# Patient Record
Sex: FEMALE | Race: WHITE
[De-identification: names, ages, dates, MRNs, and addresses within clinical notes are randomized per-mention and may not be internally consistent; named-entity substitution may affect disease eponyms.]

---

## 2018-03-27 ENCOUNTER — HOSPITAL ENCOUNTER (INPATIENT)
Dept: HOSPITAL 59 - MEDSURG | Age: 78
LOS: 1 days | Discharge: HOME HEALTH | DRG: 470 | End: 2018-03-28
Attending: ORTHOPAEDIC SURGERY | Admitting: ORTHOPAEDIC SURGERY
Payer: MEDICARE

## 2018-03-27 DIAGNOSIS — I48.91: ICD-10-CM

## 2018-03-27 DIAGNOSIS — E78.00: ICD-10-CM

## 2018-03-27 DIAGNOSIS — Z79.01: ICD-10-CM

## 2018-03-27 DIAGNOSIS — M17.12: Primary | ICD-10-CM

## 2018-03-27 LAB
ABO GROUP: (no result)
ANTIBODY SCREEN: NEGATIVE
RH TYPE: POSITIVE

## 2018-03-27 PROCEDURE — 80048 BASIC METABOLIC PNL TOTAL CA: CPT

## 2018-03-27 PROCEDURE — 85014 HEMATOCRIT: CPT

## 2018-03-27 PROCEDURE — 0SRD069 REPLACEMENT OF LEFT KNEE JOINT WITH OXIDIZED ZIRCONIUM ON POLYETHYLENE SYNTHETIC SUBSTITUTE, CEMENTED, OPEN APPROACH: ICD-10-PCS | Performed by: ORTHOPAEDIC SURGERY

## 2018-03-27 PROCEDURE — 97530 THERAPEUTIC ACTIVITIES: CPT

## 2018-03-27 PROCEDURE — 86900 BLOOD TYPING SEROLOGIC ABO: CPT

## 2018-03-27 PROCEDURE — 86901 BLOOD TYPING SEROLOGIC RH(D): CPT

## 2018-03-27 PROCEDURE — 97116 GAIT TRAINING THERAPY: CPT

## 2018-03-27 PROCEDURE — 86850 RBC ANTIBODY SCREEN: CPT

## 2018-03-27 PROCEDURE — 85018 HEMOGLOBIN: CPT

## 2018-03-27 RX ADMIN — CLINDAMYCIN PHOSPHATE SCH MLS/HR: 18 INJECTION, SOLUTION INTRAVENOUS at 17:33

## 2018-03-27 RX ADMIN — DEXTROSE AND SODIUM CHLORIDE SCH: 5; .9 INJECTION, SOLUTION INTRAVENOUS at 15:37

## 2018-03-27 RX ADMIN — DEXTROSE AND SODIUM CHLORIDE SCH MLS/HR: 5; .9 INJECTION, SOLUTION INTRAVENOUS at 19:48

## 2018-03-27 RX ADMIN — HYDROCODONE BITARTRATE AND ACETAMINOPHEN PRN EACH: 325; 10 TABLET ORAL at 21:42

## 2018-03-27 RX ADMIN — DOCUSATE SODIUM SCH MG: 100 TABLET, FILM COATED ORAL at 21:42

## 2018-03-27 RX ADMIN — Medication SCH MG: at 21:42

## 2018-03-27 NOTE — REHAB EVALUATION
Patient Information





- Patient Information


Diagnosis: DJD L Knee


Ordered Treatment: PT Evaluate and Treat


Status: Initial Evaluation


Surgery: Yes (L TKA)


Date of Surgery: 03/27/18


Past Medical/Surgical Hx: 


 PAST MEDICAL/SURGICAL HISTORY





Past Surgical History            RTKA 6-17


                                 tonsils


                                 lumpectomy left breast malignant


                                 hyst


                                 cystocele/rectocele


                                 cats bilat


                                 c scope





PMH - Respiratory





Hx Respiratory Disorders         Yes


Hx Pneumonia                     Yes


Hx Sleep Apnea                   Yes


Hx of CPAP                       Yes





PMH - Cardiovascular





Hx Cardiovascular Disorders      Yes


Hx Abnormal EKG                  Yes


Hx Edema                         Yes: lymphedema LE


Hx Irregular Heartbeat           Yes: one episode of a fib resolved pt is on 

meds


Hx Palpitations                  Yes


Hx of Mitral Valve Prolapse      Yes: minimal


Exercise Tolerance               Good





PMH - Neuro





Hx Neurological Disorders        Yes


Hx Syncope                       Yes: had episode 1 wk ago of terrible abdominal


                                 pain and then passed  see below


Comment:                         out for 5 minutes very low b/p spent night in


                                 hosp neg w/u sees cardio 3-21





PMH - GI





Hx Gastrointestinal Disorders    Yes


Hx Gastroesophageal Reflux       Yes





PMH - 





Hx Genitourinary Disorders       Yes


Hx Urinary Tract Infection       Yes: susceptible to them





PMH - Endocrine





Hx Endocrine Disorders           Yes


Hx Thyroid Disease               Yes: hypo on meds


Comment:                         parathyroid goiter no problems swallowing or


                                 laying down small





PMH - Musculoskeletal





Hx Musculoskeletal Disorders     Yes


Hx Arthritis                     Yes


Hx Osteoporosis                  osteopenia





PMH - Psych





Hx Psychiatric Problems          Yes


Hx Anxiety                       Yes


Hx Depression                    Yes





PMH - Hematology/Oncology





Hx Hematology/Oncology           Yes


Disorders                        


Hx Cancer                        Yes: breast left


Hx Radiation Therapy             Yes


Comment:                         on eliquis DO NOT USE LEFT ARM








Social History: Detail (The patient lives in a one story home with her spouse. 

There are 2 steps to enter with a railing on the left when entering the home. 

The patient's bathroom has a tub/shower combination, but she does not have/use 

a shower seat. Her toilet is elevated. She has grab bars in the shower, but 

does not around the toilet. She will be using a 4WW for ambulation)


Precautions: Other (WBAT on L)





- Time With Patient


Total Time Spent With Patient (Min): 30


Treatment Procedures: Detail (PT Initial Evaluation)





Subjective Information





- Subjective Information


Per Patient (The patient states that she is not having any pain at this time. 

She says that she has slight dizziness, but felt that she was okay to complete 

therapy activities.)





Objective Data





- Pain


Pain Present: No


Pain Intensity: 0


Pain Scale Used: Numeric (1 - 10)





- Mental Status


Patient Orientation: Oriented x3





- ROM


Within normal limits (L Knee limited as expected s/p L TKA. Other joints were 

not formally assessed, but were within functional limits for activity.)





- Strength/Tone


Within normal limits (L Knee limited as expected s/p L TKA. Other joints were 

not formally assessed, but were within functional limits for activity.)





- Bed Mobility


Independent (The patient was able to transfer from supine to sit independently, 

and was able to scoot to the middle of the bed independently)





- Transfers


Independent (The patient was independent with sit to stand and stand to sit. 

The patient was also independent with toilet transfer each direction.)





- Balance


Balance Sitting: Good (No LOB while sitting at the bedside prior to ambulation. 

Had complaints of dizziness, but seemed to alleviate symptoms with moving 

around.)


Balance Standing: Good (No LOB with immediate standing at the bedside after sit 

to stand. The patient had no LOB with gait activities completed.)





- Sensation


Intact





- Gait


Detail (The patient used a 4WW and WBAT on L during ambulation. She was able to 

ambulate from her room, to the [facility], down to the end of the [facility] floor, 

and back to her room (total of 200 feet). She had no LOB with gait activities.)





Therapy Assessment





- Therapy Assessment


Detail (The patient was independent with bed mobility and transfers, and had 

functional strength for activities. The patient required supervision for gait 

only, and had good standing balance throughout. She did have ROM and strength 

limitations as expected s/p L TKA. The patient's gait skills on stairs were not 

assessed at initial evaluation. The patient should progress well with inpatient 

PT.)





Patient Education





- Patient Education


Teaching Topic: Exercise/Activity (HEP was reviewed, which included: Quad sets, 

glute. sets, hamstring sets/Heel slides, SLR, and ankle pumps. The patient was 

instructed to complete 10 reps of each exercise and complete 1-2x/day)


Response: Return Demonstration, Verbalize Understanding


Teaching Method: Discussion, Demonstration


Teaching Recipient: Patient


Barriers To Learning: None





Problem List





- Problem List


Physical Therapy Problem List: Detail (1) Ability to ascend/descend stairs not 

assessed 2) Decreased ROM and strength in L knee)





Goals





- Goals


Physical Therapy Goals: 1) The patient will be able to ascend/descend 2 steps 

with supervision for safe entry into the home environment.  2) The patient will 

be independent with HEP to increase ROM and strength in L knee for increased 

functionality in the home.





Prognosis





- Prognosis


Good





Plan





- Plan


Physical Therapy Plan: The patient will be seen 1-2x/day M-F for gait training (

including stairs) and LE strengthening activities.

## 2018-03-28 LAB
ANION GAP SERPL CALC-SCNC: 11 MMOL/L (ref 7–16)
BUN SERPL-MCNC: 11 MG/DL (ref 8–23)
CO2 SERPL-SCNC: 24 MMOL/L (ref 22–29)
CREAT SERPL-MCNC: 0.7 MG/DL (ref 0.5–0.9)
EST GLOMERULAR FILTRATION RATE: > 60 ML/MIN
GLUCOSE SERPL-MCNC: 143 MG/DL (ref 74–109)
HCT VFR BLD CALC: 38.5 % (ref 35–47)
HGB BLD-MCNC: 12.2 GM/DL (ref 11.6–16)

## 2018-03-28 RX ADMIN — DOCUSATE SODIUM SCH MG: 100 TABLET, FILM COATED ORAL at 11:16

## 2018-03-28 RX ADMIN — HYDROCODONE BITARTRATE AND ACETAMINOPHEN PRN EACH: 325; 10 TABLET ORAL at 11:19

## 2018-03-28 RX ADMIN — Medication SCH MG: at 11:18

## 2018-03-28 RX ADMIN — HYDROCODONE BITARTRATE AND ACETAMINOPHEN PRN EACH: 325; 10 TABLET ORAL at 05:36

## 2018-03-28 RX ADMIN — CLINDAMYCIN PHOSPHATE SCH MLS/HR: 18 INJECTION, SOLUTION INTRAVENOUS at 00:27

## 2018-03-28 RX ADMIN — DEXTROSE AND SODIUM CHLORIDE SCH MLS/HR: 5; .9 INJECTION, SOLUTION INTRAVENOUS at 04:12

## 2018-03-28 NOTE — OPERATIVE NOTE
DATE:  03/27/2018.



PREOPERATIVE DIAGNOSIS:  ENDSTAGE ARTHROSIS OF THE LEFT KNEE.



POSTOPERATIVE DIAGNOSIS:  ENDSTAGE ARTHROSIS OF THE LEFT KNEE.



PROCEDURE:  Cemented left total knee arthroplasty using [employer] 
II components, with a size 4 Cobalt chrome femur, a size 3 stemmed tibial 
baseplate, and a 13-mm lipped tibial insert.  The patella was not resurfaced 
because it was too thin.



STAFF SURGEON:  Carlos Marroquin M.D.



ANESTHESIA:  SPINAL.



PREPARATION:  ChloraPrep.



INDIVIDUAL CONSIDERATIONS:  This lady is morbidly obese with a body mass index 
of 40.  Because of this, dissection was much more difficult and exposure was 
more difficult.  She had two to three inches of fat around her knee. 



PROCEDURE:  The patient was taken to the operating room and placed supine on 
the operating table.  She had successful induction of a spinal anesthetic.  Her 
left lower extremity was prepped and draped in the usual fashion.



The limb was elevated.  The tourniquet was inflated to 250 mm Hg.  Sharp 
dissection was carried down through the skin and subcutaneous tissues. Small 
veins were coagulated with a Bovie.  A medial arthrotomy was performed.  The 
patella was everted and the knee was flexed.  She had exposed bone in the notch 
and bone loss in the medial compartment.  The fat pad was resected, the 
anterior cruciate ligament was sacrificed, and provisional anterior 
meniscectomies were performed.  The capsule was released from the medial 
proximal tibia.  The initial femoral  hole was then made freehand.  The 
intramedullary femoral cutting jig was placed.  It was cut in 7.0 degrees of 
valgus, adjusted for rotation, and secured with pins for a 10 mm resection.  
The initial transverse cut was then made.  Skin guide was placed through the 
anterior and posterior  holes.  It was found that a size 4 would be 
appropriate.  I needed to translate the cutting block anteriorly 2.0 mm so as 
not to notch.  After making the cuts and trimming osteophytes, a size 4 trial 
was placed and was found to fit well.  



The tibia was brought forward and the remainder of the meniscal remnants were 
removed with a Bovie.  The extra-articular tibial cutting jig was placed, and 
it was cut in neutral with a 3-degree AP slope.  Care was taken to adjust for 
rotation and flexion using the extra-articular alignment guide and bony 
landmarks.  It was set for a 9 mm resection, keyed off the high lateral side, 
and secured with pins.  When cutting the tibia, care was taken to preserve the 
posterior cruciate ligament insertion on the tibia.  Medial osteophytes were 
removed, and I was able to fit a size 3 baseplate.  It was adjusted for 
rotation and secured with pins.  With an 13 mm trial and the femoral trial, 
there was excellent motion and stability.  Ligamentous balance, rotation, and 
alignment were thought to be normal.  The femoral  holes were impacted and 
the triflange tibial stamp was impacted, and these trial components were 
removed.



The patient unfortunately had a 19 mm thick patella.  This was way too thin to 
resurface, especially since she is morbidly obese.  I just had to trim 
osteophytes.  The tourniquet was let down briefly to get the bleeders 
posteriorly and was then placed back up again.  The knee was then thoroughly 
irrigated out with pulsatile Betadine and saline to remove any visual or 
palpable debris.  Bony surfaces were then dried.  A size 3 stemmed tibial 
baseplate was cemented into place, followed by impaction of the 13 mm lipped 
tibial insert, followed by cementing in the size 4 Cobalt chrome femur.   
Implant surfaces were compressed and excess cement was removed.  After the 
cement had set, there was excellent motion and stability.  Ligamentous balance, 
rotation, alignment, and patellofemoral tracking were normal, and no lateral 
release was required.  Again, thorough irrigation.  The periosteum and 
subcutaneous and skin were infiltrated with 30 mL of 0.5% Marcaine with 
epinephrine.  The capsule was then closed with a running #2 Quill, the 
subcutaneous was closed in multiple layers of #0 Quill, and the skin was closed 
with staples.  A total of 30 mL of saline was mixed with 1.0 gm of tranexamic 
acid which was injected into the knee through a sterile 18-gauge needle.  She 
had received 1.0 gm of tranexamic acid intravenous prior.  The patient 
tolerated the procedure well.  Needle and sponge counts were correct.  
Estimated blood loss was minimal.  She was taken back to Recovery in good 
condition.  There were no complications.



JOB NUMBER: [id number]
MTDD

## 2018-03-28 NOTE — REHAB EVALUATION
Patient Information





- Patient Information


Diagnosis: DJD L Knee


Ordered Treatment: OT Evaluate and Treat


Status: Initial Evaluation


Surgery: Yes (L TKA)


Date of Surgery: 03/27/18


Past Medical/Surgical Hx: 


 PAST MEDICAL/SURGICAL HISTORY





Past Surgical History            RTKA 6-17


                                 tonsils


                                 lumpectomy left breast malignant


                                 hyst


                                 cystocele/rectocele


                                 cats bilat


                                 c scope





PMH - Respiratory





Hx Respiratory Disorders         Yes


Hx Pneumonia                     Yes


Hx Sleep Apnea                   Yes


Hx of CPAP                       Yes





PMH - Cardiovascular





Hx Cardiovascular Disorders      Yes


Hx Abnormal EKG                  Yes


Hx Edema                         Yes: lymphedema LE


Hx Irregular Heartbeat           Yes: one episode of a fib resolved pt is on 

meds


Hx Palpitations                  Yes


Hx of Mitral Valve Prolapse      Yes: minimal


Exercise Tolerance               Good





PMH - Neuro





Hx Neurological Disorders        Yes


Hx Syncope                       Yes: had episode 1 wk ago of terrible abdominal


                                 pain and then passed  see below


Comment:                         out for 5 minutes very low b/p spent night in


                                 hosp neg w/u sees cardio 3-21





PMH - GI





Hx Gastrointestinal Disorders    Yes


Hx Gastroesophageal Reflux       Yes





PMH - 





Hx Genitourinary Disorders       Yes


Hx Urinary Tract Infection       Yes: susceptible to them





PMH - Endocrine





Hx Endocrine Disorders           Yes


Hx Thyroid Disease               Yes: hypo on meds


Comment:                         parathyroid goiter no problems swallowing or


                                 laying down small





PMH - Musculoskeletal





Hx Musculoskeletal Disorders     Yes


Hx Arthritis                     Yes


Hx Osteoporosis                  osteopenia





PMH - Psych





Hx Psychiatric Problems          Yes


Hx Anxiety                       Yes


Hx Depression                    Yes





PMH - Hematology/Oncology





Hx Hematology/Oncology           Yes


Disorders                        


Hx Cancer                        Yes: breast left


Hx Radiation Therapy             Yes


Comment:                         on eliquis DO NOT USE LEFT ARM








Premorbid Status: Detail (Pt lives with spouse in a 1 story house with basement

, laundry is in the basement.  She has 2 steps and 1 handrailing at the garage 

entrance.  She has a tub/shower combination with grab bar, no seat and an 

elevated toilet with riser, no grab bar.  She is Ind with meal prep, laundry, 

home mgmt and self cares although spouse will be assisting as needed.  She has 

a 4 wheeled walker and straight cane.)


Precautions: Universal, Fall, Other (WBAT on L)





- Time With Patient


Total Time Spent With Patient (Min): 35


Treatment Procedures: Detail (OT eval low complexity)





Subjective Information





- Subjective Information


Per Patient





Objective Data





- Pain


Pain Present: No (Pt reports no pain currently)





- Mental Status


Patient Orientation: Oriented x3





- Visual Perception


Appears within normal limits for therapeutic activities





- ROM


Within normal limits (Dain UE AROM WNL)





- Strength/Tone


Within normal limits (Dain UE strength WNL)





- Coordination


Appears within normal limits for therapeutic activities





- Bed Mobility


Independent (Ind with supine to sit.)





- Transfers


Independent (Ind with sit to stand from EOB.)





- Balance


Balance Sitting: Good


Balance Standing: Fair





- Sensation


Intact





- Gait


Detail (Pt ambulating in room with 4 wheeled walker Indly.)





- ADL's/IADL's


Detail (Pt educated and demonstrates learning of modified LE dressing 

techniques.  She required min assist to pull heel of left foot into shoe due to 

swelling and wrap.  Reviewed shower and kitchen safety/modifications and pt 

verbalized understanding.)





Therapy Assessment





- Therapy Assessment


Detail (Pt is safe and Ind with self care activities.)





Problem List





- Problem List


Physical Therapy Problem List: Detail ( 2) Decreased ROM and strength in L knee)


Occupational Therapy Problem List: Detail (No current OT problems identified.)





Goals





- Goals


Physical Therapy Goals: Pt. has met all inpatient PT goals.


Occupational Therapy Goals: No current OT goals identified.





Prognosis





- Prognosis


Good





Plan





- Plan


Physical Therapy Plan: D/C pt. from inpatient PT.


Occupational Therapy Plan: No further IP OT recommended.  Thank you for this 

referral.

## 2018-03-28 NOTE — DISCHARGE SUMMARY
DATE OF ADMISSION:  03/27/2018



DATE OF DISCHARGE:  03/28/2018



DATE OF SURGERY:  03/27/2018



HISTORY: Anjelica is a delightful 77-year-old female who presents with end-stage 
arthrosis of her left knee. She was admitted after a left total knee 
arthroplasty. Postoperatively, she did great. Her hospital course was 
unremarkable. Her discharge hemoglobin was 12.2.  



DISCHARGE INSTRUCTIONS:  The plan is to discharge her to home in the care of 
her family. Home PT and Visiting Nurse have been arranged. She will be given 
Norco for pain. She chronically takes Eliquis, so she will resume that and that 
will cover her for DVT prophylaxis. The Visiting Nurse will remove her sutures 
in two weeks and she will follow-up in my office in four weeks. 



PRIMARY DIAGNOSIS/PRIMARY DIAGNOSIS:  END-STAGE ARTHROSIS OF THE LEFT KNEE. 



OPERATIONS AND PROCEDURES:  CEMENTED LEFT TOTAL KNEE ARTHROPLASTY. 



JOB NUMBER:  [id number]
MTDD

## 2018-03-28 NOTE — PHYSICAL THERAPY TX NOTE
Physical Therapy Tx Note





- Treatment Note


Tolerated: Good (Pt. required 1 rest period with ambulation for 20 seconds. Pt. 

denied nausea/SOB with ambulation training, just fatigue. Pt. independently 

ambulated 52 feet with four wheeled walker, and ascended and descended three 

steps with single point cane appropriately.)


Total Time Spent With Patient: 45


Physical Therapy Tx Note: Detail (Pt. completed gait training and HEP review. 

She appropriately ascended and descended 3 steps. Pt. verbalized good 

understanding of precautions and was indepdendent with bed mobility and 

transfer. Pt. did exhibit quad weakness that limits independent SLR with bed 

mobility, but did express that she has a strap for home and will help to lift 

her involved LE via strap. Pt. was left supine and nursing was notified of pt.'

s status, nursing stated the pt. is to have another dressing applied following 

PT so the pt. was not hooked up to IPC or cryo. Pt. was left supine with call 

light availble.)


Physical Therapy Problem List: Detail ( 2) Decreased ROM and strength in L knee)


Physical Therapy Goals: Pt. has met all inpatient PT goals.


Prognosis: Good (Pt. is appropriate for D/C to home environment with assistance 

from  and use of single point cane when using stairs, as demonstrated 

today with gait training for safe ambulation. Pt.'s pain elevated to a 4/10 at 

the highest.)


Physical Therapy Plan: D/C pt. from inpatient PT.